# Patient Record
Sex: FEMALE | Race: WHITE | NOT HISPANIC OR LATINO | Employment: UNEMPLOYED | URBAN - METROPOLITAN AREA
[De-identification: names, ages, dates, MRNs, and addresses within clinical notes are randomized per-mention and may not be internally consistent; named-entity substitution may affect disease eponyms.]

---

## 2022-10-12 ENCOUNTER — OFFICE VISIT (OUTPATIENT)
Dept: FAMILY MEDICINE CLINIC | Facility: CLINIC | Age: 32
End: 2022-10-12

## 2022-10-12 DIAGNOSIS — J30.2 SEASONAL ALLERGIES: ICD-10-CM

## 2022-10-12 DIAGNOSIS — R53.82 CHRONIC FATIGUE: ICD-10-CM

## 2022-10-12 DIAGNOSIS — E55.9 VITAMIN D DEFICIENCY: ICD-10-CM

## 2022-10-12 DIAGNOSIS — M54.9 CHRONIC BILATERAL BACK PAIN, UNSPECIFIED BACK LOCATION: Primary | ICD-10-CM

## 2022-10-12 DIAGNOSIS — G89.29 CHRONIC BILATERAL BACK PAIN, UNSPECIFIED BACK LOCATION: Primary | ICD-10-CM

## 2022-10-12 PROCEDURE — 99204 OFFICE O/P NEW MOD 45 MIN: CPT | Performed by: FAMILY MEDICINE

## 2022-10-12 NOTE — PROGRESS NOTES
Subjective     Mariza Pagan is a 32 y.o. female.     Chief Complaint   Patient presents with   • Pain     Pt ears are in pain, mostly on the right and wakes her up during the night    • Allergies   • Back Pain   • Fatigue       History of Present Illness     She is c/o Lower back pain for > 6 years , constant , with on/off flare up, 4-7/10, dose not radiate   No pain when she is on her cycle   No h/o fall or trauma   C/o chronic fatigue with G body ache   Had PT with little help   Had XR L spine , she dose not know the result   Has normal BM  No urinary sx   Denies weakness or numbness   She has 3 kids 16,14,2 years   She has IUD for 2 years     Vit D defi; was on supplements     Seasonal allergies ; sx worse when she moved to [place] , taking OTC Rx with some help  She is c/o on/off Rt ear pain with fullness for > 1 month, no F,C , no discharge     No Labs and notes from previous PCP in [facility]     The following portions of the patient's history were reviewed and updated as appropriate: allergies, current medications, past family history, past medical history, past social history, past surgical history and problem list.        Review of Systems   Constitutional: Positive for fatigue. Negative for activity change, appetite change and chills.   HENT: Positive for congestion and ear pain.    Musculoskeletal: Positive for back pain and myalgias.       Wt Readings from Last 3 Encounters:   10/12/22 58.5 kg (129 lb)      Temp Readings from Last 3 Encounters:   10/12/22 98.3 °F (36.8 °C) (Infrared)     BP Readings from Last 3 Encounters:   10/12/22 100/56     Pulse Readings from Last 3 Encounters:   10/12/22 78         Vitals:    10/12/22 1612   BP: 100/56   Pulse: 78   Temp:    SpO2:            10/12/22  1552   Weight: 58.5 kg (129 lb)         Body mass index is 25.19 kg/m².      No current outpatient medications on file.     No current facility-administered medications for this visit.                Objective   Physical  Exam  Vitals and nursing note reviewed.   Constitutional:       General: She is not in acute distress.     Appearance: She is well-developed. She is not ill-appearing, toxic-appearing or diaphoretic.   HENT:      Head: Normocephalic.      Right Ear: Tympanic membrane, ear canal and external ear normal.      Left Ear: Tympanic membrane, ear canal and external ear normal.      Nose: Nose normal. No congestion or rhinorrhea.      Mouth/Throat:      Mouth: Mucous membranes are moist.      Pharynx: Oropharynx is clear.   Eyes:      Conjunctiva/sclera: Conjunctivae normal.   Neck:      Thyroid: No thyromegaly.      Comments: No enlarged thyroid    Cardiovascular:      Rate and Rhythm: Normal rate and regular rhythm.      Heart sounds: Normal heart sounds. No murmur heard.    No friction rub. No gallop.   Pulmonary:      Effort: Pulmonary effort is normal. No respiratory distress.      Breath sounds: Normal breath sounds. No stridor. No wheezing, rhonchi or rales.   Abdominal:      General: Bowel sounds are normal. There is no distension.      Palpations: Abdomen is soft. There is no mass.      Tenderness: There is no abdominal tenderness. There is no guarding or rebound.      Hernia: No hernia is present.   Musculoskeletal:         General: Deformity present. Normal range of motion.      Cervical back: Neck supple.      Comments: Asymmetry of his T-L spine    Skin:     General: Skin is warm.      Coloration: Skin is not pale.      Findings: No erythema or rash.   Neurological:      Mental Status: She is alert and oriented to person, place, and time.      Motor: No abnormal muscle tone.   Psychiatric:         Mood and Affect: Mood normal.         Behavior: Behavior normal.         Thought Content: Thought content normal.         Judgment: Judgment normal.           Assessment & Plan   Diagnoses and all orders for this visit:    1. Chronic bilateral back pain, unspecified back location (Primary);  - Concern for scoliosis    -     XR spine scoliosis 2 or 3 views; Future  -     XR Spine Lumbar 2 or 3 View; Future    2. Chronic fatigue;  - New problem, need w/u   - DDx; anemia vs lyte disorder vs thyroid dis vs vit d defi, vs others   -     Comprehensive metabolic panel; Future  -     CBC No Differential; Future  -     Vitamin B12; Future  -     TSH Rfx On Abnormal To Free T4; Future    3. Vitamin D deficiency  -     Vitamin D 25 hydroxy; Future    4. Seasonal allergies  - Stable, continue current Rx      Patient was given instructions and counseling regarding her condition or for health maintenance advice.   Please see specific information pulled into the AVS if appropriate.   Medical assistant and I wore mask and eyewear protection during entire encounter.    Patient wore mask.         I have fully discussed the nature of the medical condition(s) risks, complications, management, safe and proper use of medications.   She stated no allergy to the above prescribed medication.  I have discussed the SIDE EFFECT OF MEDICATION and importance TO report any side effect , the patient expressed good understanding.  Encouraged medication compliance and the importance of keeping scheduled follow up appointments with me and any other providers.    Patient instructed to follow up with our office for results on any labs/imaging ordered during this visit.    Home care discussed  All questions answered  Patient verbalizes understanding and agrees to treatment plan.     Follow up: Return in about 4 weeks (around 11/9/2022) for follow up on current illness, labs before apointment.

## 2022-10-13 VITALS
TEMPERATURE: 98.3 F | WEIGHT: 129 LBS | OXYGEN SATURATION: 99 % | HEART RATE: 78 BPM | BODY MASS INDEX: 25.32 KG/M2 | SYSTOLIC BLOOD PRESSURE: 100 MMHG | HEIGHT: 60 IN | DIASTOLIC BLOOD PRESSURE: 56 MMHG

## 2022-10-27 DIAGNOSIS — R53.82 CHRONIC FATIGUE: ICD-10-CM

## 2022-10-27 DIAGNOSIS — E55.9 VITAMIN D DEFICIENCY: ICD-10-CM

## 2022-12-01 ENCOUNTER — CLINICAL SUPPORT (OUTPATIENT)
Dept: FAMILY MEDICINE CLINIC | Facility: CLINIC | Age: 32
End: 2022-12-01

## 2022-12-01 NOTE — PROGRESS NOTES
Pt here for x-ray only apt. Lumbar x-ray performed. Masks/face shield/appropriate PPE were worn for the entirety of the visit by the patient and myself. Pt aware she will be notified w results after report is received from radiology. CS

## 2022-12-02 DIAGNOSIS — G89.29 CHRONIC BILATERAL BACK PAIN, UNSPECIFIED BACK LOCATION: ICD-10-CM

## 2022-12-02 DIAGNOSIS — M54.9 CHRONIC BILATERAL BACK PAIN, UNSPECIFIED BACK LOCATION: ICD-10-CM

## 2022-12-02 LAB
25(OH)D3+25(OH)D2 SERPL-MCNC: 29 NG/ML (ref 30–100)
ALBUMIN SERPL-MCNC: 4.7 G/DL (ref 3.5–5.2)
ALBUMIN/GLOB SERPL: 2 G/DL
ALP SERPL-CCNC: 44 U/L (ref 39–117)
ALT SERPL-CCNC: 10 U/L (ref 1–33)
AST SERPL-CCNC: 12 U/L (ref 1–32)
BILIRUB SERPL-MCNC: 0.2 MG/DL (ref 0–1.2)
BUN SERPL-MCNC: 10 MG/DL (ref 6–20)
BUN/CREAT SERPL: 17.5 (ref 7–25)
CALCIUM SERPL-MCNC: 9.3 MG/DL (ref 8.6–10.5)
CHLORIDE SERPL-SCNC: 106 MMOL/L (ref 98–107)
CO2 SERPL-SCNC: 25.7 MMOL/L (ref 22–29)
CREAT SERPL-MCNC: 0.57 MG/DL (ref 0.57–1)
EGFRCR SERPLBLD CKD-EPI 2021: 123.2 ML/MIN/1.73
ERYTHROCYTE [DISTWIDTH] IN BLOOD BY AUTOMATED COUNT: 16 % (ref 12.3–15.4)
GLOBULIN SER CALC-MCNC: 2.3 GM/DL
GLUCOSE SERPL-MCNC: 88 MG/DL (ref 65–99)
HCT VFR BLD AUTO: 30.3 % (ref 34–46.6)
HGB BLD-MCNC: 8.6 G/DL (ref 12–15.9)
MCH RBC QN AUTO: 21.7 PG (ref 26.6–33)
MCHC RBC AUTO-ENTMCNC: 28.4 G/DL (ref 31.5–35.7)
MCV RBC AUTO: 76.5 FL (ref 79–97)
PLATELET # BLD AUTO: 304 10*3/MM3 (ref 140–450)
POTASSIUM SERPL-SCNC: 4.3 MMOL/L (ref 3.5–5.2)
PROT SERPL-MCNC: 7 G/DL (ref 6–8.5)
RBC # BLD AUTO: 3.96 10*6/MM3 (ref 3.77–5.28)
SODIUM SERPL-SCNC: 140 MMOL/L (ref 136–145)
TSH SERPL DL<=0.005 MIU/L-ACNC: 2.95 UIU/ML (ref 0.27–4.2)
VIT B12 SERPL-MCNC: 501 PG/ML (ref 211–946)
WBC # BLD AUTO: 5.58 10*3/MM3 (ref 3.4–10.8)

## 2023-01-05 ENCOUNTER — OFFICE VISIT (OUTPATIENT)
Dept: FAMILY MEDICINE CLINIC | Facility: CLINIC | Age: 33
End: 2023-01-05
Payer: MEDICAID

## 2023-01-05 VITALS
TEMPERATURE: 98.2 F | WEIGHT: 128.4 LBS | RESPIRATION RATE: 16 BRPM | HEART RATE: 90 BPM | SYSTOLIC BLOOD PRESSURE: 96 MMHG | DIASTOLIC BLOOD PRESSURE: 58 MMHG | OXYGEN SATURATION: 99 % | BODY MASS INDEX: 25.08 KG/M2

## 2023-01-05 DIAGNOSIS — D50.0 IRON DEFICIENCY ANEMIA DUE TO CHRONIC BLOOD LOSS: ICD-10-CM

## 2023-01-05 DIAGNOSIS — R22.2 MASS IN CHEST: Primary | ICD-10-CM

## 2023-01-05 PROCEDURE — 99213 OFFICE O/P EST LOW 20 MIN: CPT | Performed by: FAMILY MEDICINE

## 2023-01-05 RX ORDER — CEPHALEXIN 500 MG/1
500 CAPSULE ORAL 2 TIMES DAILY
Qty: 14 CAPSULE | Refills: 0 | Status: SHIPPED | OUTPATIENT
Start: 2023-01-05 | End: 2023-01-16

## 2023-01-16 ENCOUNTER — OFFICE VISIT (OUTPATIENT)
Dept: FAMILY MEDICINE CLINIC | Facility: CLINIC | Age: 33
End: 2023-01-16
Payer: MEDICAID

## 2023-01-16 VITALS
TEMPERATURE: 96.6 F | WEIGHT: 128 LBS | DIASTOLIC BLOOD PRESSURE: 64 MMHG | OXYGEN SATURATION: 97 % | SYSTOLIC BLOOD PRESSURE: 100 MMHG | BODY MASS INDEX: 25 KG/M2 | RESPIRATION RATE: 16 BRPM | HEART RATE: 87 BPM

## 2023-01-16 DIAGNOSIS — R22.9 SUBCUTANEOUS MASS: Primary | ICD-10-CM

## 2023-01-16 DIAGNOSIS — K21.9 CHRONIC GERD: ICD-10-CM

## 2023-01-16 DIAGNOSIS — D50.0 IRON DEFICIENCY ANEMIA DUE TO CHRONIC BLOOD LOSS: ICD-10-CM

## 2023-01-16 DIAGNOSIS — R14.0 ABDOMINAL BLOATING WITH CRAMPS: ICD-10-CM

## 2023-01-16 DIAGNOSIS — R10.9 ABDOMINAL BLOATING WITH CRAMPS: ICD-10-CM

## 2023-01-16 PROCEDURE — 99214 OFFICE O/P EST MOD 30 MIN: CPT | Performed by: FAMILY MEDICINE

## 2023-01-16 RX ORDER — FERROUS SULFATE 325(65) MG
325 TABLET ORAL
COMMUNITY

## 2023-01-16 RX ORDER — FAMOTIDINE 40 MG/1
40 TABLET, FILM COATED ORAL
Qty: 30 TABLET | Refills: 2 | Status: SHIPPED | OUTPATIENT
Start: 2023-01-16 | End: 2023-02-01 | Stop reason: SDUPTHER

## 2023-01-16 NOTE — PROGRESS NOTES
Subjective     Alberto Vanegas is a 32 y.o. female.     Chief Complaint   Patient presents with   • Mass     Patient would like to get a US   • Abdominal Pain   • Heartburn   • Anemia       History of Present Illness     Pt has retrosternal lump for > 4 weeks , hurts to touch ,increasing in size , no F,C . Started on Abx with no better.     Anemia ; due to heavy cycle. She starts taking iron pills recently but makes her stomach sick , she takes it at night with less discomfort.      Chronic abd distension with cramps for > 1 year; sx on/off , worse when she is eats meat , beans and milk pd. als has heartburn worse with spicy and acidic food.   Has every other day BM with hard stool       The following portions of the patient's history were reviewed and updated as appropriate: allergies, current medications, past family history, past medical history, past social history, past surgical history and problem list.        Review of Systems   Gastrointestinal: Positive for abdominal distention, abdominal pain, constipation, nausea, GERD and indigestion. Negative for anal bleeding, blood in stool and vomiting.       Vitals:    01/16/23 0955   BP: 100/64   Pulse: 87   Resp: 16   Temp: 96.6 °F (35.9 °C)   SpO2: 97%           01/16/23  0955   Weight: 58.1 kg (128 lb)         Body mass index is 25 kg/m².      Current Outpatient Medications   Medication Sig Dispense Refill   • ferrous sulfate 325 (65 FE) MG tablet Take 325 mg by mouth Daily With Breakfast.     • famotidine (Pepcid) 40 MG tablet Take 1 tablet by mouth Every Morning Before Breakfast. 30 tablet 2     No current facility-administered medications for this visit.                Objective   Physical Exam  Vitals and nursing note reviewed.   Constitutional:       General: She is not in acute distress.     Appearance: She is not ill-appearing, toxic-appearing or diaphoretic.   HENT:      Mouth/Throat:      Mouth: Mucous membranes are moist.   Eyes:       Conjunctiva/sclera: Conjunctivae normal.   Neck:      Comments: No enlarged thyroid    Cardiovascular:      Rate and Rhythm: Normal rate and regular rhythm.      Heart sounds: Normal heart sounds. No murmur heard.  Pulmonary:      Effort: Pulmonary effort is normal. No respiratory distress.      Breath sounds: Normal breath sounds. No stridor. No wheezing or rhonchi.   Abdominal:      General: Bowel sounds are normal. There is distension.      Palpations: Abdomen is soft. There is no mass.      Tenderness: There is no abdominal tenderness. There is no guarding or rebound.      Hernia: No hernia is present.   Musculoskeletal:      Cervical back: Neck supple.   Skin:     General: Skin is warm.      Coloration: Skin is pale.      Findings: No erythema or rash.   Neurological:      Mental Status: She is alert and oriented to person, place, and time.   Psychiatric:         Mood and Affect: Mood normal.         Behavior: Behavior normal.         Thought Content: Thought content normal.           Assessment & Plan   Diagnoses and all orders for this visit:    1. Subcutaneous mass (Primary)  -     US Head Neck Soft Tissue; Future    2. Iron deficiency anemia due to chronic blood loss;  - Continue diet and IRON PILLS   - Advised to take colace from OTC to avoid constipation     3. Abdominal bloating with cramps;   concern for food allergy ?  -     Food Allergen Panel With / IgE  -     H. Pylori Breath Test - Breath, Lung    4. Chronic GERD;  - Will try;   -     famotidine (Pepcid) 40 MG tablet; Take 1 tablet by mouth Every Morning Before Breakfast.  Dispense: 30 tablet; Refill: 2  Discussed with patient for decreasing RYANN symptoms: eliminate foods that may trigger symptoms, avoid alcohol at bedtime, and avoid lying down after 3 hours of eating.  Common irritating foods include: chocolate, garlic, onions, citrus fruits, coffee, alcohol, highly seasoned foods and carbonated beverages.     Patient was given instructions and  counseling regarding her condition or for health maintenance advice.   Please see specific information pulled into the AVS if appropriate.   Medical assistant and I wore mask and eyewear protection during entire encounter.    Patient wore mask.         I have fully discussed the nature of the medical condition(s) risks, complications, management, safe and proper use of medications.   She stated no allergy to the above prescribed medication.  I have discussed the SIDE EFFECT OF MEDICATION and importance TO report any side effect , the patient expressed good understanding.  Encouraged medication compliance and the importance of keeping scheduled follow up appointments with me and any other providers.    Patient instructed to follow up with our office for results on any labs/imaging ordered during this visit.    Home care discussed  All questions answered  Patient verbalizes understanding and agrees to treatment plan.     Follow up: Return for WILL CALL YOU FOR LBAS/XR RESULT.

## 2023-01-17 LAB — UREA BREATH TEST QL: POSITIVE

## 2023-01-19 LAB
CLAM IGE QN: <0.1 KU/L
CODFISH IGE QN: <0.1 KU/L
CONV CLASS DESCRIPTION: NORMAL
CORN IGE QN: <0.1 KU/L
COW MILK IGE QN: <0.1 KU/L
EGG WHITE IGE QN: <0.1 KU/L
IGE SERPL-ACNC: 81 IU/ML (ref 6–495)
PEANUT IGE QN: <0.1 KU/L
SCALLOP IGE QN: <0.1 KU/L
SHRIMP IGE QN: <0.1 KU/L
SOYBEAN IGE QN: <0.1 KU/L
WALNUT IGE QN: <0.1 KU/L
WHEAT IGE QN: <0.1 KU/L

## 2023-01-27 ENCOUNTER — HOSPITAL ENCOUNTER (OUTPATIENT)
Dept: ULTRASOUND IMAGING | Facility: HOSPITAL | Age: 33
Discharge: HOME | End: 2023-01-27
Admitting: FAMILY MEDICINE
Payer: MEDICAID

## 2023-01-27 DIAGNOSIS — R22.9 SUBCUTANEOUS MASS: ICD-10-CM

## 2023-01-27 PROCEDURE — 76604 US EXAM CHEST: CPT

## 2023-02-01 ENCOUNTER — OFFICE VISIT (OUTPATIENT)
Dept: FAMILY MEDICINE CLINIC | Facility: CLINIC | Age: 33
End: 2023-02-01
Payer: MEDICAID

## 2023-02-01 VITALS
HEART RATE: 96 BPM | DIASTOLIC BLOOD PRESSURE: 64 MMHG | HEIGHT: 60 IN | SYSTOLIC BLOOD PRESSURE: 98 MMHG | OXYGEN SATURATION: 100 % | TEMPERATURE: 97.7 F | BODY MASS INDEX: 25.21 KG/M2 | WEIGHT: 128.4 LBS | RESPIRATION RATE: 16 BRPM

## 2023-02-01 DIAGNOSIS — A04.8 H. PYLORI INFECTION: Primary | ICD-10-CM

## 2023-02-01 PROCEDURE — 99214 OFFICE O/P EST MOD 30 MIN: CPT | Performed by: FAMILY MEDICINE

## 2023-02-01 RX ORDER — CLARITHROMYCIN 500 MG/1
500 TABLET, COATED ORAL 2 TIMES DAILY
Qty: 28 TABLET | Refills: 0 | Status: SHIPPED | OUTPATIENT
Start: 2023-02-01 | End: 2023-02-15

## 2023-02-01 RX ORDER — OMEPRAZOLE 20 MG/1
20 CAPSULE, DELAYED RELEASE ORAL 2 TIMES DAILY
Qty: 28 CAPSULE | Refills: 0 | Status: SHIPPED | OUTPATIENT
Start: 2023-02-01 | End: 2023-02-15

## 2023-02-01 RX ORDER — AMOXICILLIN 500 MG/1
1000 CAPSULE ORAL 2 TIMES DAILY
Qty: 56 CAPSULE | Refills: 0 | Status: SHIPPED | OUTPATIENT
Start: 2023-02-01 | End: 2023-02-15

## 2023-02-01 RX ORDER — BISMUTH SUBSALICYLATE 262 MG
2 TABLET ORAL 4 TIMES DAILY
Qty: 112 EACH | Refills: 0 | Status: SHIPPED | OUTPATIENT
Start: 2023-02-01 | End: 2023-02-15

## 2023-02-01 NOTE — PROGRESS NOTES
Subjective     Alberto Vanegas is a 32 y.o. female.     Chief Complaint   Patient presents with   • Abdominal Pain   • Cyst   • discuss US and lab  results       History of Present Illness     Pt with chronic stomach pain with bloating , N and GERD for > 1 year. Eating HD with no better.  Labs done last OV, showed +ve for H. Pylori Rx  Her  dx with it many years ago, was on Rx.    Pt has retrosternal lump for > 8 weeks , hurts to touch.  US done was neg, discussed with pt.       The following portions of the patient's history were reviewed and updated as appropriate: allergies, current medications, past family history, past medical history, past social history, past surgical history and problem list.        Review of Systems   Gastrointestinal: Positive for abdominal distention, abdominal pain, nausea and GERD. Negative for vomiting.       Vitals:    02/01/23 1107   BP: 98/64   Pulse: 96   Resp: 16   Temp: 97.7 °F (36.5 °C)   SpO2: 100%           02/01/23  1107   Weight: 58.2 kg (128 lb 6.4 oz)         Body mass index is 25.08 kg/m².      Current Outpatient Medications   Medication Sig Dispense Refill   • BIOTIN PO Take  by mouth.     • ferrous sulfate 325 (65 FE) MG tablet Take 325 mg by mouth Daily With Breakfast.     • amoxicillin (AMOXIL) 500 MG capsule Take 2 capsules by mouth 2 (Two) Times a Day for 14 days. 56 capsule 0   • Bismuth Subsalicylate ([facility] Bismuth) 262 MG tablet Take 2 tablets by mouth 4 (Four) Times a Day for 14 days. 112 each 0   • clarithromycin (BIAXIN) 500 MG tablet Take 1 tablet by mouth 2 (Two) Times a Day for 14 days. 28 tablet 0   • omeprazole (priLOSEC) 20 MG capsule Take 1 capsule by mouth 2 (Two) Times a Day for 14 days. 28 capsule 0     No current facility-administered medications for this visit.                Objective   Physical Exam  Vitals and nursing note reviewed.   Constitutional:       General: She is not in acute distress.     Appearance: She is not  ill-appearing, toxic-appearing or diaphoretic.   HENT:      Mouth/Throat:      Mouth: Mucous membranes are moist.   Abdominal:      General: Bowel sounds are normal. There is no distension.      Palpations: Abdomen is soft. There is no mass.      Tenderness: There is abdominal tenderness. There is no guarding or rebound.      Hernia: No hernia is present.      Comments: Mild epigast TTP   Skin:     General: Skin is warm.      Coloration: Skin is not jaundiced.   Neurological:      Mental Status: She is alert and oriented to person, place, and time.   Psychiatric:         Mood and Affect: Mood normal.         Behavior: Behavior normal.         Thought Content: Thought content normal.         Judgment: Judgment normal.           Assessment & Plan   Diagnoses and all orders for this visit:    1. H. pylori infection (Primary)  - New problem, discussed plan of Rx and S/E in details   -     clarithromycin (BIAXIN) 500 MG tablet; Take 1 tablet by mouth 2 (Two) Times a Day for 14 days.  Dispense: 28 tablet; Refill: 0  -     amoxicillin (AMOXIL) 500 MG capsule; Take 2 capsules by mouth 2 (Two) Times a Day for 14 days.  Dispense: 56 capsule; Refill: 0  -     omeprazole (priLOSEC) 20 MG capsule; Take 1 capsule by mouth 2 (Two) Times a Day for 14 days.  Dispense: 28 capsule; Refill: 0  -     Bismuth Subsalicylate ([facility] Bismuth) 262 MG tablet; Take 2 tablets by mouth 4 (Four) Times a Day for 14 days.  Dispense: 112 each; Refill: 0  - Close monitoring and f/u in 6-8 weeks     Patient was given instructions and counseling regarding her condition or for health maintenance advice.   Please see specific information pulled into the AVS if appropriate.   Medical assistant and I wore mask and eyewear protection during entire encounter.    Patient wore mask.         I have fully discussed the nature of the medical condition(s) risks, complications, management, safe and proper use of medications.   She stated no allergy to the above  prescribed medication.  I have discussed the SIDE EFFECT OF MEDICATION and importance TO report any side effect , the patient expressed good understanding.  Encouraged medication compliance and the importance of keeping scheduled follow up appointments with me and any other providers.    Patient instructed to follow up with our office for results on any labs/imaging ordered during this visit.    Home care discussed  All questions answered  Patient verbalizes understanding and agrees to treatment plan.     Follow up: Return in about 6 weeks (around 3/16/2023) for follow up on current illness.

## 2023-02-02 ENCOUNTER — TELEPHONE (OUTPATIENT)
Dept: FAMILY MEDICINE CLINIC | Facility: CLINIC | Age: 33
End: 2023-02-02
Payer: MEDICAID

## 2023-02-02 ENCOUNTER — PRIOR AUTHORIZATION (OUTPATIENT)
Dept: FAMILY MEDICINE CLINIC | Facility: CLINIC | Age: 33
End: 2023-02-02
Payer: MEDICAID

## 2023-02-02 NOTE — TELEPHONE ENCOUNTER
Omeprazole should be 2 x day as it is for H pylori treatment ,plz do PA if needed   plz let her know to start all medication at same time

## 2023-02-02 NOTE — TELEPHONE ENCOUNTER
Patient stated can she take omeprazole 1x day because insurance won't cover twice a day? Or I can try to do a PA to see if they will cover twice a day. pepto bismol patient have to get otc.

## 2023-02-02 NOTE — TELEPHONE ENCOUNTER
Pt called because the pharmacy never received 2 of 4 scripts but she states she doesn't know which 2 they did receive and refused to call the pharmacy back and ask and would like all 4 scripts to be called in again.

## 2023-03-29 ENCOUNTER — OFFICE VISIT (OUTPATIENT)
Dept: FAMILY MEDICINE CLINIC | Facility: CLINIC | Age: 33
End: 2023-03-29
Payer: MEDICAID

## 2023-03-29 VITALS
SYSTOLIC BLOOD PRESSURE: 98 MMHG | DIASTOLIC BLOOD PRESSURE: 68 MMHG | TEMPERATURE: 97.7 F | HEIGHT: 60 IN | WEIGHT: 124 LBS | BODY MASS INDEX: 24.35 KG/M2 | OXYGEN SATURATION: 98 % | HEART RATE: 81 BPM

## 2023-03-29 DIAGNOSIS — K21.9 CHRONIC GERD: ICD-10-CM

## 2023-03-29 DIAGNOSIS — R14.0 ABDOMINAL BLOATING WITH CRAMPS: ICD-10-CM

## 2023-03-29 DIAGNOSIS — R68.89 COLD INTOLERANCE: ICD-10-CM

## 2023-03-29 DIAGNOSIS — H60.503 ACUTE OTITIS EXTERNA OF BOTH EARS, UNSPECIFIED TYPE: ICD-10-CM

## 2023-03-29 DIAGNOSIS — D50.0 IRON DEFICIENCY ANEMIA DUE TO CHRONIC BLOOD LOSS: ICD-10-CM

## 2023-03-29 DIAGNOSIS — A04.8 H. PYLORI INFECTION: Primary | ICD-10-CM

## 2023-03-29 DIAGNOSIS — E53.8 VITAMIN B12 DEFICIENCY: ICD-10-CM

## 2023-03-29 DIAGNOSIS — R10.9 ABDOMINAL BLOATING WITH CRAMPS: ICD-10-CM

## 2023-03-29 PROCEDURE — 1160F RVW MEDS BY RX/DR IN RCRD: CPT | Performed by: FAMILY MEDICINE

## 2023-03-29 PROCEDURE — 99214 OFFICE O/P EST MOD 30 MIN: CPT | Performed by: FAMILY MEDICINE

## 2023-03-29 PROCEDURE — 1159F MED LIST DOCD IN RCRD: CPT | Performed by: FAMILY MEDICINE

## 2023-03-29 NOTE — PROGRESS NOTES
Subjective     Alberto Vanegas is a 32 y.o. female.     Chief Complaint   Patient presents with   • H. pylori     2M fu - sx improved while on abx - pt is still having abd discomfort    • Ear Fullness       History of Present Illness     Pt with known H pylori, started on medication 2 months ago, her sx completely resolved with Rx.   Once she finished the medication , her sx back c/o bad reflux and stomach pain.    She is concerning for thyroid dis as she has cold intolerance and hair loss . Last TSH was normal but she was on biotin for hair loss. She stated that she stopped taking biotin to get her TSH rechecked     Vit B12 DEFIC; no longer n supplements , feels tired     Anemia ; she is on iron pills , her Hb was low 8.6 , her cycle very heavy.  She has IUD     C/o bilateral ears fullness , no discharge     Lab Results   Component Value Date    WBC 5.63 03/29/2023    HGB 11.9 (L) 03/29/2023    HCT 36.4 03/29/2023    MCV 88.1 03/29/2023     03/29/2023              The following portions of the patient's history were reviewed and updated as appropriate: allergies, current medications, past family history, past medical history, past social history, past surgical history and problem list.        Review of Systems   Constitutional: Positive for fatigue.   Gastrointestinal: Positive for abdominal distention, abdominal pain and GERD.       Vitals:    03/29/23 1232   BP: 98/68   Pulse: 81   Temp: 97.7 °F (36.5 °C)   SpO2: 98%           03/29/23  1232   Weight: 56.2 kg (124 lb)         Body mass index is 24.22 kg/m².      Current Outpatient Medications   Medication Sig Dispense Refill   • ferrous sulfate 325 (65 FE) MG tablet Take 1 tablet by mouth Daily With Breakfast.     • BIOTIN PO Take  by mouth. (Patient not taking: Reported on 3/29/2023)     • omeprazole (priLOSEC) 20 MG capsule Take 1 capsule by mouth 2 (Two) Times a Day. 60 capsule 2     No current facility-administered medications for this  visit.                Objective   Physical Exam  Vitals and nursing note reviewed.   Constitutional:       General: She is not in acute distress.     Appearance: She is not ill-appearing, toxic-appearing or diaphoretic.   HENT:      Mouth/Throat:      Mouth: Mucous membranes are moist.   Neck:      Comments: No enlarged thyroid    Cardiovascular:      Rate and Rhythm: Normal rate and regular rhythm.      Heart sounds: Normal heart sounds. No murmur heard.  Pulmonary:      Effort: Pulmonary effort is normal. No respiratory distress.      Breath sounds: Normal breath sounds. No stridor. No wheezing or rhonchi.   Abdominal:      General: Bowel sounds are normal. There is no distension.      Palpations: Abdomen is soft. There is no mass.      Tenderness: There is no abdominal tenderness. There is no guarding or rebound.      Hernia: No hernia is present.   Musculoskeletal:      Cervical back: Neck supple.   Skin:     Coloration: Skin is pale.   Neurological:      Mental Status: She is alert.   Psychiatric:         Mood and Affect: Mood normal.         Behavior: Behavior normal.         Thought Content: Thought content normal.         Judgment: Judgment normal.           Assessment & Plan   Diagnoses and all orders for this visit:    1. H. pylori infection (Primary);  - Will recheck for H. Pylori after Rx  -     H. Pylori Breath Test - Breath, Lung  - Consider CT abd vs referral to GI for EGD if no better     2. Iron deficiency anemia due to chronic blood loss  -     CBC (No Diff)    3. Abdominal bloating with cramps  - Will recheck for H. Pylori after Rx  -     H. Pylori Breath Test - Breath, Lung  - Consider CT abd vs referral to GI for EGD if no better     4. Chronic GERD  - Will recheck for H. Pylori after Rx  -     H. Pylori Breath Test - Breath, Lung  - Will start on daily PPI  - OMEPRAZOLE 20 MG bid     5. Cold intolerance  -     TSH Rfx On Abnormal To Free T4    6. Vitamin B12 deficiency  -     Vitamin  B12      7. Acute OE of both ears;  - cipro-HC ear drops       Patient was given instructions and counseling regarding her condition or for health maintenance advice.   Please see specific information pulled into the AVS if appropriate.         I have fully discussed the nature of the medical condition(s) risks, complications, management, safe and proper use of medications.   She stated no allergy to the above prescribed medication.  I have discussed the SIDE EFFECT OF MEDICATION and importance TO report any side effect , the patient expressed good understanding.  Encouraged medication compliance and the importance of keeping scheduled follow up appointments with me and any other providers.    Patient instructed to follow up with our office for results on any labs/imaging ordered during this visit.    Home care discussed  All questions answered  Patient verbalizes understanding and agrees to treatment plan.     Follow up: Return for WILL CALL YOU FOR LBAS/XR RESULT.

## 2023-03-30 LAB
ERYTHROCYTE [DISTWIDTH] IN BLOOD BY AUTOMATED COUNT: 15.6 % (ref 12.3–15.4)
HCT VFR BLD AUTO: 36.4 % (ref 34–46.6)
HGB BLD-MCNC: 11.9 G/DL (ref 12–15.9)
MCH RBC QN AUTO: 28.8 PG (ref 26.6–33)
MCHC RBC AUTO-ENTMCNC: 32.7 G/DL (ref 31.5–35.7)
MCV RBC AUTO: 88.1 FL (ref 79–97)
PLATELET # BLD AUTO: 199 10*3/MM3 (ref 140–450)
RBC # BLD AUTO: 4.13 10*6/MM3 (ref 3.77–5.28)
TSH SERPL DL<=0.005 MIU/L-ACNC: 2.34 UIU/ML (ref 0.27–4.2)
UREA BREATH TEST QL: NEGATIVE
VIT B12 SERPL-MCNC: 517 PG/ML (ref 211–946)
WBC # BLD AUTO: 5.63 10*3/MM3 (ref 3.4–10.8)

## 2023-03-30 RX ORDER — OMEPRAZOLE 20 MG/1
20 CAPSULE, DELAYED RELEASE ORAL 2 TIMES DAILY
Qty: 60 CAPSULE | Refills: 2 | Status: SHIPPED | OUTPATIENT
Start: 2023-03-30

## 2023-04-03 ENCOUNTER — TELEPHONE (OUTPATIENT)
Dept: FAMILY MEDICINE CLINIC | Facility: CLINIC | Age: 33
End: 2023-04-03

## 2023-04-03 RX ORDER — CIPROFLOXACIN/HYDROCORTISONE 0.2 %-1 %
3 SUSPENSION, DROPS(FINAL DOSAGE FORM)(ML) OTIC (EAR) 2 TIMES DAILY
Qty: 10 ML | Refills: 0 | Status: SHIPPED | OUTPATIENT
Start: 2023-04-03 | End: 2023-04-04

## 2023-04-03 NOTE — TELEPHONE ENCOUNTER
Caller: Alberto Vanegas    Relationship: Self    Best call back number: [phone]    What is the best time to reach you: ANY TIME    Who are you requesting to speak with (clinical staff, provider,  specific staff member): CLINICAL STAFF    What was the call regarding: PATIENT STATES THAT SHE SAW DR. SHEN IN OFFICE ON 3/29/23    AND DR. SHEN WAS SUPPOSED TO SEND A PRESCRIPTION FOR EAR DROPS TO THE PHARMACY.   PATIENT SAYS THAT THE PHARMACY DOES NOT HAVE A PRESCRIPTION.    Do you require a callback: YES    PLEASE ADVISE.

## 2023-04-04 ENCOUNTER — PRIOR AUTHORIZATION (OUTPATIENT)
Dept: FAMILY MEDICINE CLINIC | Facility: CLINIC | Age: 33
End: 2023-04-04
Payer: MEDICAID

## 2023-09-26 DIAGNOSIS — K21.9 CHRONIC GERD: ICD-10-CM

## 2023-09-26 RX ORDER — OMEPRAZOLE 20 MG/1
CAPSULE, DELAYED RELEASE ORAL
Qty: 60 CAPSULE | Refills: 2 | Status: SHIPPED | OUTPATIENT
Start: 2023-09-26

## 2023-10-27 NOTE — PROGRESS NOTES
Subjective     Alberto Vanegsa is a 32 y.o. female.     Chief Complaint   Patient presents with   • Follow-up     Discuss labs   • chest wall nodule        History of Present Illness     Last OV , labs ordered, showed Anemia eith Hb of 8.6. she feels fatigue and tired. Has good appetite   Has heavy cycle , last for 10 days , heavy for first 2 days with clots , change ~ 10 x /day.   Has IUD since 6/2020  Not on iron pills  On daily Vit D 2000 U supplements , her last Vit D level was Vit D 29.0    Lab Results   Component Value Date    WBC 5.58 12/01/2022    HGB 8.6 (L) 12/01/2022    HCT 30.3 (L) 12/01/2022    MCV 76.5 (L) 12/01/2022     12/01/2022       C/o retrosternal lump for 1 month , hurts to touch ,increasing in size , no F,C       Lab Results   Component Value Date    GLUCOSE 88 12/01/2022    BUN 10 12/01/2022    CREATININE 0.57 12/01/2022    BCR 17.5 12/01/2022    K 4.3 12/01/2022    CO2 25.7 12/01/2022    CALCIUM 9.3 12/01/2022    PROTENTOTREF 7.0 12/01/2022    ALBUMIN 4.70 12/01/2022    LABIL2 2.0 12/01/2022    AST 12 12/01/2022    ALT 10 12/01/2022       TSH    TSH 12/1/22   TSH 2.950               The following portions of the patient's history were reviewed and updated as appropriate: allergies, current medications, past family history, past medical history, past social history, past surgical history and problem list.        Review of Systems   Constitutional: Positive for fatigue. Negative for activity change, appetite change, chills and fever.       Vitals:    01/05/23 1047   BP: 96/58   Pulse: 90   Resp: 16   Temp: 98.2 °F (36.8 °C)   SpO2: 99%           01/05/23  1047   Weight: 58.2 kg (128 lb 6.4 oz)         Body mass index is 25.08 kg/m².      Current Outpatient Medications   Medication Sig Dispense Refill   • cephalexin (Keflex) 500 MG capsule Take 1 capsule by mouth 2 (Two) Times a Day. 14 capsule 0     No current facility-administered medications for this visit.                 Objective   Physical Exam  Vitals and nursing note reviewed.   Constitutional:       General: She is not in acute distress.     Appearance: She is not ill-appearing, toxic-appearing or diaphoretic.   HENT:      Mouth/Throat:      Mouth: Mucous membranes are moist.   Cardiovascular:      Rate and Rhythm: Normal rate and regular rhythm.      Heart sounds: Normal heart sounds. No murmur heard.  Pulmonary:      Effort: Pulmonary effort is normal. No respiratory distress.      Breath sounds: Normal breath sounds. No stridor. No wheezing or rhonchi.   Musculoskeletal:      Cervical back: Neck supple.   Skin:     General: Skin is warm.      Coloration: Skin is pale.      Comments: Very small nodule under the retrosternal area between the 2 breasts    Neurological:      Mental Status: She is alert and oriented to person, place, and time.   Psychiatric:         Mood and Affect: Mood normal.         Behavior: Behavior normal.         Thought Content: Thought content normal.           Assessment & Plan   Diagnoses and all orders for this visit:    1. Mass in chest (Primary)  Comments:  under the skin, will treat with ab, if no better will do US  Orders:  -     cephalexin (Keflex) 500 MG capsule; Take 1 capsule by mouth 2 (Two) Times a Day.  Dispense: 14 capsule; Refill: 0    2. Iron deficiency anemia due to chronic blood loss;  - Advised to take iron pills from OTC, f/u with GYN for IUD check , consider other contraception method.            Patient was given instructions and counseling regarding her condition or for health maintenance advice.   Please see specific information pulled into the AVS if appropriate.   Medical assistant and I wore mask and eyewear protection during entire encounter.    Patient wore mask.         I have fully discussed the nature of the medical condition(s) risks, complications, management, safe and proper use of medications.   She stated no allergy to the above prescribed medication.  I have  discussed the SIDE EFFECT OF MEDICATION and importance TO report any side effect , the patient expressed good understanding.  Encouraged medication compliance and the importance of keeping scheduled follow up appointments with me and any other providers.    Patient instructed to follow up with our office for results on any labs/imaging ordered during this visit.    Home care discussed  All questions answered  Patient verbalizes understanding and agrees to treatment plan.     Follow up: Return in about 3 months (around 4/5/2023) for follow up on current illness.            incontinence

## 2024-05-29 ENCOUNTER — OFFICE VISIT (OUTPATIENT)
Dept: FAMILY MEDICINE CLINIC | Facility: CLINIC | Age: 34
End: 2024-05-29
Payer: COMMERCIAL

## 2024-05-29 VITALS
BODY MASS INDEX: 25.68 KG/M2 | SYSTOLIC BLOOD PRESSURE: 90 MMHG | TEMPERATURE: 97.5 F | WEIGHT: 130.8 LBS | HEIGHT: 60 IN | DIASTOLIC BLOOD PRESSURE: 62 MMHG | RESPIRATION RATE: 16 BRPM | OXYGEN SATURATION: 98 % | HEART RATE: 90 BPM

## 2024-05-29 DIAGNOSIS — G89.29 CHRONIC PAIN OF LEFT KNEE: ICD-10-CM

## 2024-05-29 DIAGNOSIS — R53.82 CHRONIC FATIGUE: Primary | ICD-10-CM

## 2024-05-29 DIAGNOSIS — M25.562 CHRONIC PAIN OF LEFT KNEE: ICD-10-CM

## 2024-05-29 DIAGNOSIS — N92.1 MENORRHAGIA WITH IRREGULAR CYCLE: ICD-10-CM

## 2024-05-29 DIAGNOSIS — D50.0 IRON DEFICIENCY ANEMIA DUE TO CHRONIC BLOOD LOSS: ICD-10-CM

## 2024-05-29 DIAGNOSIS — E55.9 VITAMIN D DEFICIENCY: ICD-10-CM

## 2024-05-29 DIAGNOSIS — R21 RASH: ICD-10-CM

## 2024-05-29 PROCEDURE — 99214 OFFICE O/P EST MOD 30 MIN: CPT | Performed by: FAMILY MEDICINE

## 2024-05-29 PROCEDURE — 1160F RVW MEDS BY RX/DR IN RCRD: CPT | Performed by: FAMILY MEDICINE

## 2024-05-29 PROCEDURE — 1159F MED LIST DOCD IN RCRD: CPT | Performed by: FAMILY MEDICINE

## 2024-05-29 PROCEDURE — 1125F AMNT PAIN NOTED PAIN PRSNT: CPT | Performed by: FAMILY MEDICINE

## 2024-05-29 RX ORDER — TRIAMCINOLONE ACETONIDE 1 MG/G
1 OINTMENT TOPICAL 2 TIMES DAILY
Qty: 80 G | Refills: 1 | Status: SHIPPED | OUTPATIENT
Start: 2024-05-29

## 2024-05-29 RX ORDER — SWAB
1 SWAB, NON-MEDICATED MISCELLANEOUS DAILY
Qty: 90 EACH | Refills: 2 | Status: SHIPPED | OUTPATIENT
Start: 2024-05-29

## 2024-05-29 NOTE — PROGRESS NOTES
Subjective     Alberto Vanegas is a 34 y.o. female.     Chief Complaint   Patient presents with    Pain     Feet, finger, hand x 6 months.    Itchy chest     1 1/2 year.       History of Present Illness     Due to language barrier, an Tajik  was present during the history-taking and subsequent discussion (and for part of the physical exam) with this patient.      She is c/o chronic on/off hands and feet spasm for 6 months, her fingers contracted.    Chronic Lt knee pain , worse when she bends, 4-7/10. No h/o fall or trauma   She is concerning for ARTHRITIS.     C/o fatigue and tired for > 3 months, not depressed . Her appetite is good.    Has on/off rash under breasts and ambit , itchy.     VIT D DEFI; WAS ON SUPPLE    ANEMIA; WAS ON IRON PILLS, FEELS TIRED   Has IUD , her cycle heavy and last > 1 week   She is planning to take off the IUD       The following portions of the patient's history were reviewed and updated as appropriate: allergies, current medications, past family history, past medical history, past social history, past surgical history, and problem list.        Review of Systems   Skin:  Positive for color change.       Vitals:    05/29/24 1505   BP: 90/62   Pulse: 90   Resp: 16   Temp: 97.5 °F (36.4 °C)   SpO2: 98%           05/29/24  1505   Weight: 59.3 kg (130 lb 12.8 oz)         Body mass index is 25.55 kg/m².      Current Outpatient Medications   Medication Sig Dispense Refill    BIOTIN PO Take  by mouth.      ferrous sulfate 325 (65 FE) MG tablet Take 1 tablet by mouth Daily With Breakfast.      Prenatal 28-0.8 MG tablet Take 1 tablet by mouth Daily. 90 each 2    triamcinolone (KENALOG) 0.1 % ointment Apply 1 Application topically to the appropriate area as directed 2 (Two) Times a Day. 80 g 1     No current facility-administered medications for this visit.                Objective   Physical Exam  Vitals and nursing note reviewed.   Constitutional:       General: She is not in  acute distress.     Appearance: She is not toxic-appearing.   Cardiovascular:      Rate and Rhythm: Normal rate and regular rhythm.      Heart sounds: Normal heart sounds. No murmur heard.  Pulmonary:      Effort: Pulmonary effort is normal. No respiratory distress.      Breath sounds: Normal breath sounds. No stridor. No wheezing or rhonchi.   Musculoskeletal:         General: No swelling, tenderness or deformity. Normal range of motion.   Skin:     Coloration: Skin is pale.      Findings: Erythema and rash present.   Neurological:      Mental Status: She is alert and oriented to person, place, and time.   Psychiatric:         Mood and Affect: Mood normal.         Behavior: Behavior normal.         Thought Content: Thought content normal.           Assessment & Plan   Diagnoses and all orders for this visit:    1. Chronic fatigue (Primary)  -     Prenatal 28-0.8 MG tablet; Take 1 tablet by mouth Daily.  Dispense: 90 each; Refill: 2  -     Vitamin B12  -     Basic Metabolic Panel  -     HARRY by IFA, Reflex 9-biomarkers profile  -     Ferritin    2. Chronic pain of left knee    3. Vitamin D deficiency  -     Vitamin D,25-Hydroxy    4. Rash  Comments:  discussed supportive care  Orders:  -     triamcinolone (KENALOG) 0.1 % ointment; Apply 1 Application topically to the appropriate area as directed 2 (Two) Times a Day.  Dispense: 80 g; Refill: 1    5. Iron deficiency anemia due to chronic blood loss  Comments:  likely from blood loss/heavy cycle  advised to take daily MVT    6. Menorrhagia with irregular cycle  Comments:  hoda  2/2 IUD, plan to remove it           I spent 35 minutes caring for this patient on this date of service. This time includes time spent by me in the following activities:preparing for the visit,reviewing previous medical records,  performing a medically appropriate examination and/or evaluation, counseling and educating the patient/family/caregiver, and documenting information in the medical  record.       Patient was given instructions and counseling regarding her condition or for health maintenance advice. Please see specific information pulled into the AVS if appropriate.       I have fully discussed the nature of the medical condition(s) risks, complications, management, safe and proper use of medications.   Pt stated no allergy to the above prescribed medication.  I have discussed the SIDE EFFECT OF MEDICATION and importance TO report any side effect , the patient expressed good understanding.  Encouraged medication compliance and the importance of keeping scheduled follow up appointments with me and any other providers.    Patient instructed to follow up with our office for results on any labs/imaging ordered during this visit.    Home care discussed  All questions answered  Patient verbalizes understanding and agrees to treatment plan.     Follow up: Return for WILL CALL YOU FOR LBAS/XR RESULT.

## 2024-05-31 LAB
25(OH)D3+25(OH)D2 SERPL-MCNC: 29.4 NG/ML (ref 30–100)
ANA SER QL IF: NEGATIVE
BUN SERPL-MCNC: 13 MG/DL (ref 6–20)
BUN/CREAT SERPL: 27.1 (ref 7–25)
CALCIUM SERPL-MCNC: 9.5 MG/DL (ref 8.6–10.5)
CHLORIDE SERPL-SCNC: 103 MMOL/L (ref 98–107)
CO2 SERPL-SCNC: 25.6 MMOL/L (ref 22–29)
CREAT SERPL-MCNC: 0.48 MG/DL (ref 0.57–1)
EGFRCR SERPLBLD CKD-EPI 2021: 126.9 ML/MIN/1.73
FERRITIN SERPL-MCNC: 27.5 NG/ML (ref 13–150)
GLUCOSE SERPL-MCNC: 85 MG/DL (ref 65–99)
LABORATORY COMMENT REPORT: NORMAL
POTASSIUM SERPL-SCNC: 4.1 MMOL/L (ref 3.5–5.2)
SODIUM SERPL-SCNC: 138 MMOL/L (ref 136–145)
VIT B12 SERPL-MCNC: 448 PG/ML (ref 211–946)

## 2025-07-02 ENCOUNTER — OFFICE VISIT (OUTPATIENT)
Dept: FAMILY MEDICINE CLINIC | Facility: CLINIC | Age: 35
End: 2025-07-02
Payer: COMMERCIAL

## 2025-07-02 VITALS
HEART RATE: 88 BPM | DIASTOLIC BLOOD PRESSURE: 64 MMHG | TEMPERATURE: 97.8 F | WEIGHT: 143 LBS | RESPIRATION RATE: 16 BRPM | SYSTOLIC BLOOD PRESSURE: 86 MMHG | OXYGEN SATURATION: 99 % | BODY MASS INDEX: 28.07 KG/M2 | HEIGHT: 60 IN

## 2025-07-02 DIAGNOSIS — G89.29 CHRONIC NECK PAIN: ICD-10-CM

## 2025-07-02 DIAGNOSIS — M54.2 CHRONIC NECK PAIN: ICD-10-CM

## 2025-07-02 DIAGNOSIS — E55.9 VITAMIN D DEFICIENCY: ICD-10-CM

## 2025-07-02 DIAGNOSIS — R53.82 CHRONIC FATIGUE: Primary | ICD-10-CM

## 2025-07-02 PROCEDURE — 99214 OFFICE O/P EST MOD 30 MIN: CPT | Performed by: FAMILY MEDICINE

## 2025-07-02 PROCEDURE — 1125F AMNT PAIN NOTED PAIN PRSNT: CPT | Performed by: FAMILY MEDICINE

## 2025-07-02 PROCEDURE — 1160F RVW MEDS BY RX/DR IN RCRD: CPT | Performed by: FAMILY MEDICINE

## 2025-07-02 PROCEDURE — 1159F MED LIST DOCD IN RCRD: CPT | Performed by: FAMILY MEDICINE

## 2025-07-02 RX ORDER — IBUPROFEN 600 MG/1
600 TABLET, FILM COATED ORAL
COMMUNITY
Start: 2025-04-26 | End: 2025-07-02

## 2025-07-02 RX ORDER — PREDNISONE 20 MG/1
40 TABLET ORAL DAILY
Qty: 10 TABLET | Refills: 0 | Status: SHIPPED | OUTPATIENT
Start: 2025-07-02 | End: 2025-07-07

## 2025-07-02 RX ORDER — FLUTICASONE PROPIONATE 50 MCG
2 SPRAY, SUSPENSION (ML) NASAL DAILY
COMMUNITY

## 2025-07-02 NOTE — PROGRESS NOTES
Subjective     Alberto Vanegas is a 35 y.o. female.     Chief Complaint   Patient presents with    Chronic fatigue     After eating small meals.     Pain     Neck, all over body.     Dizziness       History of Present Illness     Feeling fatigue and tired for 3-4 months, has on/off palpitation, has poor appetite , poor energy.Not depressed.     Chronic neck pain for 2 weeks,4-6/10, worse with movement,  sx worse lately , Her Rt arm feels weak and numb.     Vit D defi; not on suppl    Anemia ; wa son iron pills   She is using Nexplanon       The following portions of the patient's history were reviewed and updated as appropriate: allergies, current medications, past family history, past medical history, past social history, past surgical history, and problem list.        Review of Systems   Constitutional:  Positive for fatigue.       Vitals:    07/02/25 1148   BP: (!) 86/64   Pulse: 88   Resp: 16   Temp: 97.8 °F (36.6 °C)   SpO2: 99%           07/02/25  1148   Weight: 64.9 kg (143 lb)         Body mass index is 27.93 kg/m².      Current Outpatient Medications   Medication Sig Dispense Refill    fluticasone (FLONASE) 50 MCG/ACT nasal spray Administer 2 sprays into the nostril(s) as directed by provider Daily.      diclofenac (VOLTAREN) 50 MG EC tablet Take 1 tablet by mouth 2 (Two) Times a Day. 30 tablet 0    predniSONE (DELTASONE) 20 MG tablet Take 2 tablets by mouth Daily for 5 days. 10 tablet 0    Prenatal 28-0.8 MG tablet Take 1 tablet by mouth Daily. (Patient not taking: Reported on 7/2/2025) 90 each 2     No current facility-administered medications for this visit.                Objective   Physical Exam  Vitals and nursing note reviewed.   Constitutional:       General: She is not in acute distress.     Appearance: She is not toxic-appearing.   Cardiovascular:      Rate and Rhythm: Normal rate and regular rhythm.      Heart sounds: Normal heart sounds. No murmur heard.  Pulmonary:      Effort: Pulmonary  effort is normal. No respiratory distress.      Breath sounds: Normal breath sounds. No stridor. No wheezing, rhonchi or rales.   Neurological:      Mental Status: She is alert and oriented to person, place, and time.   Psychiatric:         Mood and Affect: Mood normal.         Behavior: Behavior normal.         Thought Content: Thought content normal.           Assessment & Plan   Diagnoses and all orders for this visit:    1. Chronic fatigue (Primary)  Comments:  needs w/u  Orders:  -     Vitamin B12  -     CBC (No Diff)  -     Hemoglobin A1c  -     TSH Rfx On Abnormal To Free T4  -     Comprehensive metabolic panel  -     Iron Profile w/o Ferritin    2. Chronic neck pain  -     Ambulatory Referral to Physical Therapy for Evaluation & Treatment  -     predniSONE (DELTASONE) 20 MG tablet; Take 2 tablets by mouth Daily for 5 days.  Dispense: 10 tablet; Refill: 0  -     diclofenac (VOLTAREN) 50 MG EC tablet; Take 1 tablet by mouth 2 (Two) Times a Day.  Dispense: 30 tablet; Refill: 0    3. Vitamin D deficiency  -     Vitamin D,25-Hydroxy             Patient was given instructions and counseling regarding her condition or for health maintenance advice. Please see specific information pulled into the AVS if appropriate.       I have fully discussed the nature of the medical condition(s) risks, complications, management, safe and proper use of medications.   Pt stated no allergy to the above prescribed medication.  I have discussed the SIDE EFFECT OF MEDICATION and importance TO report any side effect , the patient expressed good understanding.  Encouraged medication compliance and the importance of keeping scheduled follow up appointments with me and any other providers.    Patient instructed to follow up with our office for results on any labs/imaging ordered during this visit.    Home care discussed  All questions answered  Patient verbalizes understanding and agrees to treatment plan.     Follow up: Return in about 2  weeks (around 7/16/2025) for follow up on current illness.

## 2025-07-03 LAB
25(OH)D3+25(OH)D2 SERPL-MCNC: 66.6 NG/ML (ref 30–100)
ALBUMIN SERPL-MCNC: 4.1 G/DL (ref 3.9–4.9)
ALP SERPL-CCNC: 51 IU/L (ref 44–121)
ALT SERPL-CCNC: 12 IU/L (ref 0–32)
AST SERPL-CCNC: 16 IU/L (ref 0–40)
BILIRUB SERPL-MCNC: <0.2 MG/DL (ref 0–1.2)
BUN SERPL-MCNC: 11 MG/DL (ref 6–20)
BUN/CREAT SERPL: 20 (ref 9–23)
CALCIUM SERPL-MCNC: 8.8 MG/DL (ref 8.7–10.2)
CHLORIDE SERPL-SCNC: 103 MMOL/L (ref 96–106)
CO2 SERPL-SCNC: 21 MMOL/L (ref 20–29)
CREAT SERPL-MCNC: 0.55 MG/DL (ref 0.57–1)
EGFRCR SERPLBLD CKD-EPI 2021: 122 ML/MIN/1.73
ERYTHROCYTE [DISTWIDTH] IN BLOOD BY AUTOMATED COUNT: 13.6 % (ref 11.7–15.4)
GLOBULIN SER CALC-MCNC: 2.4 G/DL (ref 1.5–4.5)
GLUCOSE SERPL-MCNC: 72 MG/DL (ref 70–99)
HBA1C MFR BLD: 5.5 % (ref 4.8–5.6)
HCT VFR BLD AUTO: 42.3 % (ref 34–46.6)
HGB BLD-MCNC: 13.3 G/DL (ref 11.1–15.9)
IRON SATN MFR SERPL: 32 % (ref 15–55)
IRON SERPL-MCNC: 109 UG/DL (ref 27–159)
MCH RBC QN AUTO: 29.2 PG (ref 26.6–33)
MCHC RBC AUTO-ENTMCNC: 31.4 G/DL (ref 31.5–35.7)
MCV RBC AUTO: 93 FL (ref 79–97)
PLATELET # BLD AUTO: 232 X10E3/UL (ref 150–450)
POTASSIUM SERPL-SCNC: 4.4 MMOL/L (ref 3.5–5.2)
PROT SERPL-MCNC: 6.5 G/DL (ref 6–8.5)
RBC # BLD AUTO: 4.55 X10E6/UL (ref 3.77–5.28)
SODIUM SERPL-SCNC: 138 MMOL/L (ref 134–144)
TIBC SERPL-MCNC: 345 UG/DL (ref 250–450)
TSH SERPL DL<=0.005 MIU/L-ACNC: 2.11 UIU/ML (ref 0.45–4.5)
UIBC SERPL-MCNC: 236 UG/DL (ref 131–425)
VIT B12 SERPL-MCNC: 392 PG/ML (ref 232–1245)
WBC # BLD AUTO: 7.3 X10E3/UL (ref 3.4–10.8)

## 2025-07-17 ENCOUNTER — OFFICE VISIT (OUTPATIENT)
Dept: FAMILY MEDICINE CLINIC | Facility: CLINIC | Age: 35
End: 2025-07-17
Payer: COMMERCIAL

## 2025-07-17 VITALS
WEIGHT: 146.2 LBS | DIASTOLIC BLOOD PRESSURE: 50 MMHG | OXYGEN SATURATION: 98 % | SYSTOLIC BLOOD PRESSURE: 90 MMHG | HEART RATE: 95 BPM | TEMPERATURE: 99.1 F | BODY MASS INDEX: 28.7 KG/M2 | HEIGHT: 60 IN

## 2025-07-17 DIAGNOSIS — M54.50 CHRONIC MIDLINE LOW BACK PAIN WITHOUT SCIATICA: ICD-10-CM

## 2025-07-17 DIAGNOSIS — G89.29 CHRONIC MIDLINE LOW BACK PAIN WITHOUT SCIATICA: ICD-10-CM

## 2025-07-17 DIAGNOSIS — R53.82 CHRONIC FATIGUE: Primary | ICD-10-CM

## 2025-07-17 DIAGNOSIS — G89.29 CHRONIC NECK PAIN: ICD-10-CM

## 2025-07-17 DIAGNOSIS — M54.2 CHRONIC NECK PAIN: ICD-10-CM

## 2025-07-17 PROCEDURE — 1160F RVW MEDS BY RX/DR IN RCRD: CPT | Performed by: FAMILY MEDICINE

## 2025-07-17 PROCEDURE — 1159F MED LIST DOCD IN RCRD: CPT | Performed by: FAMILY MEDICINE

## 2025-07-17 PROCEDURE — 1125F AMNT PAIN NOTED PAIN PRSNT: CPT | Performed by: FAMILY MEDICINE

## 2025-07-17 PROCEDURE — 99214 OFFICE O/P EST MOD 30 MIN: CPT | Performed by: FAMILY MEDICINE

## 2025-07-17 RX ORDER — SWAB
1 SWAB, NON-MEDICATED MISCELLANEOUS DAILY
Qty: 90 EACH | Refills: 2 | Status: SHIPPED | OUTPATIENT
Start: 2025-07-17

## 2025-07-17 RX ORDER — LEVOCETIRIZINE DIHYDROCHLORIDE 5 MG/1
5 TABLET, FILM COATED ORAL
COMMUNITY
Start: 2025-06-24 | End: 2026-06-24

## 2025-07-17 NOTE — PROGRESS NOTES
Subjective     Alberto Vanegas is a 35 y.o. female.     Chief Complaint   Patient presents with    Follow-up     Pt is present to f/u on recent dx of fatigue and neck pain. Pt states both issues have gotten worse since previous visit. New onset of foot pain.     Fatigue    Neck Pain       History of Present Illness     Presents for FU On the followings;     Pt with chronic fatigue and feeling tired with poor energy.  Labs done few weeks ago, showed; mild elevated LDL , b12 on lower end of normal       Chronic neck pain and lower back pain for > 2 months , 4-8/10, worse with movement, feels her M. Stiff.   Sx worse lately , her Rt arm feels weak and numb.          Labs has been ordered and personally/independently interpreted , discussed with pt     Lab Results   Component Value Date    CHLPL 178 03/05/2018    TRIG 66 03/05/2018    HDL 63 03/05/2018     (H) 03/05/2018     Lab Results   Component Value Date    GLUCOSE 72 07/02/2025    BUN 11 07/02/2025    CREATININE 0.55 (L) 07/02/2025     07/02/2025    K 4.4 07/02/2025     07/02/2025    CALCIUM 8.8 07/02/2025    PROTEINTOT 6.5 07/02/2025    ALBUMIN 4.1 07/02/2025    ALT 12 07/02/2025    AST 16 07/02/2025    ALKPHOS 51 07/02/2025    BILITOT <0.2 07/02/2025    GLOB 2.4 07/02/2025    AGRATIO 2.0 12/01/2022    BCR 20 07/02/2025    EGFR 122 07/02/2025     Lab Results   Component Value Date    AJQLFYWM91 392 07/02/2025     Lab Results   Component Value Date    WBC 7.3 07/02/2025    HGB 13.3 07/02/2025    HCT 42.3 07/02/2025    MCV 93 07/02/2025     07/02/2025            The following portions of the patient's history were reviewed and updated as appropriate: allergies, current medications, past family history, past medical history, past social history, past surgical history, and problem list.        Review of Systems   Musculoskeletal:  Positive for back pain, myalgias and neck pain.       Vitals:    07/17/25 1430   BP: 90/50   Pulse: 95    Temp: 99.1 °F (37.3 °C)   SpO2: 98%           07/17/25  1430   Weight: 66.3 kg (146 lb 3.2 oz)         Body mass index is 28.55 kg/m².      Current Outpatient Medications   Medication Sig Dispense Refill    fluticasone (FLONASE) 50 MCG/ACT nasal spray Administer 2 sprays into the nostril(s) as directed by provider Daily.      levocetirizine (XYZAL) 5 MG tablet Take 1 tablet by mouth.      Prenatal 28-0.8 MG tablet Take 1 tablet by mouth Daily. 90 each 2    diclofenac (VOLTAREN) 50 MG EC tablet Take 1 tablet by mouth 2 (Two) Times a Day. 30 tablet 0     No current facility-administered medications for this visit.                Objective   Physical Exam  Vitals and nursing note reviewed.   Constitutional:       General: She is not in acute distress.     Appearance: She is not toxic-appearing.   Cardiovascular:      Rate and Rhythm: Normal rate and regular rhythm.      Heart sounds: Normal heart sounds. No murmur heard.  Pulmonary:      Effort: Pulmonary effort is normal. No respiratory distress.      Breath sounds: Normal breath sounds. No stridor. No wheezing or rhonchi.   Musculoskeletal:         General: Tenderness present. No swelling or deformity.      Cervical back: Neck supple. Tenderness present.   Lymphadenopathy:      Cervical: No cervical adenopathy.   Skin:     Findings: No bruising.   Neurological:      Mental Status: She is alert and oriented to person, place, and time.   Psychiatric:         Mood and Affect: Mood normal.         Behavior: Behavior normal.         Thought Content: Thought content normal.           Assessment & Plan   Diagnoses and all orders for this visit:    1. Chronic fatigue (Primary)  Comments:  Labs has been ordered and personally/independently interpreted , discussed with pt  Orders:  -     Prenatal 28-0.8 MG tablet; Take 1 tablet by mouth Daily.  Dispense: 90 each; Refill: 2    2. Chronic neck pain  -     Ambulatory Referral to Physical Therapy for Evaluation & Treatment    3.  Chronic midline low back pain without sciatica  -     Ambulatory Referral to Physical Therapy for Evaluation & Treatment             Patient was given instructions and counseling regarding her condition or for health maintenance advice. Please see specific information pulled into the AVS if appropriate.       I have fully discussed the nature of the medical condition(s) risks, complications, management, safe and proper use of medications.   Pt stated no allergy to the above prescribed medication.  I have discussed the SIDE EFFECT OF MEDICATION and importance TO report any side effect , the patient expressed good understanding.  Encouraged medication compliance and the importance of keeping scheduled follow up appointments with me and any other providers.    Patient instructed to follow up with our office for results on any labs/imaging ordered during this visit.    Home care discussed  All questions answered  Patient verbalizes understanding and agrees to treatment plan.     Follow up: Return in about 4 months (around 11/17/2025) for physical.

## 2025-07-28 ENCOUNTER — OFFICE VISIT (OUTPATIENT)
Dept: FAMILY MEDICINE CLINIC | Facility: CLINIC | Age: 35
End: 2025-07-28
Payer: COMMERCIAL

## 2025-07-28 VITALS
TEMPERATURE: 98.4 F | OXYGEN SATURATION: 98 % | BODY MASS INDEX: 29.13 KG/M2 | DIASTOLIC BLOOD PRESSURE: 60 MMHG | SYSTOLIC BLOOD PRESSURE: 96 MMHG | WEIGHT: 148.4 LBS | HEIGHT: 60 IN | RESPIRATION RATE: 16 BRPM | HEART RATE: 85 BPM

## 2025-07-28 DIAGNOSIS — M54.2 CHRONIC NECK PAIN: ICD-10-CM

## 2025-07-28 DIAGNOSIS — I95.89 OTHER SPECIFIED HYPOTENSION: ICD-10-CM

## 2025-07-28 DIAGNOSIS — N92.1 MENORRHAGIA WITH IRREGULAR CYCLE: Primary | ICD-10-CM

## 2025-07-28 DIAGNOSIS — G89.29 CHRONIC NECK PAIN: ICD-10-CM

## 2025-07-28 DIAGNOSIS — D50.0 IRON DEFICIENCY ANEMIA DUE TO CHRONIC BLOOD LOSS: ICD-10-CM

## 2025-07-28 DIAGNOSIS — J30.2 SEASONAL ALLERGIES: ICD-10-CM

## 2025-07-28 PROCEDURE — 1160F RVW MEDS BY RX/DR IN RCRD: CPT | Performed by: FAMILY MEDICINE

## 2025-07-28 PROCEDURE — 1159F MED LIST DOCD IN RCRD: CPT | Performed by: FAMILY MEDICINE

## 2025-07-28 PROCEDURE — 1125F AMNT PAIN NOTED PAIN PRSNT: CPT | Performed by: FAMILY MEDICINE

## 2025-07-28 PROCEDURE — 99214 OFFICE O/P EST MOD 30 MIN: CPT | Performed by: FAMILY MEDICINE

## 2025-07-28 NOTE — PROGRESS NOTES
Subjective     Alberto Vanegas is a 35 y.o. female.     Chief Complaint   Patient presents with    Menstrual Problem     13 days on period.     Headache    Dizziness       History of Present Illness     Presents to discuss the followings;     Has irregular cycle   LMP 2 weeks ago, came heavy , last for 2 weeks   She was on Perimount N nad maribell for 7 months   She is on Nexplanon for 1 year   Her cycle more heavy since insertion   Feels fatigue. Has on/off HA, feels dizzy on/off   Has Anemia , not taking MVT  Her BP low   Neck pain ; pain on/off, better with Voltaren     SA; sx worse with weather changes , doing well on current Rx.    Lab Results   Component Value Date    LABIRON 32 07/02/2025    TIBC 345 07/02/2025    FERRITIN 27.50 05/29/2024     Lab Results   Component Value Date    WBC 7.3 07/02/2025    HGB 13.3 07/02/2025    HCT 42.3 07/02/2025    MCV 93 07/02/2025     07/02/2025            The following portions of the patient's history were reviewed and updated as appropriate: allergies, current medications, past family history, past medical history, past social history, past surgical history, and problem list.      Vitals:    07/28/25 1447   BP: 96/60   Pulse: 85   Resp: 16   Temp: 98.4 °F (36.9 °C)   SpO2: 98%           07/28/25  1447   Weight: 67.3 kg (148 lb 6.4 oz)         Body mass index is 28.98 kg/m².      Current Outpatient Medications   Medication Sig Dispense Refill    diclofenac (VOLTAREN) 50 MG EC tablet Take 1 tablet by mouth 2 (Two) Times a Day. 30 tablet 0    fluticasone (FLONASE) 50 MCG/ACT nasal spray Administer 2 sprays into the nostril(s) as directed by provider Daily.      levocetirizine (XYZAL) 5 MG tablet Take 1 tablet by mouth.      Prenatal 28-0.8 MG tablet Take 1 tablet by mouth Daily. 90 each 2     No current facility-administered medications for this visit.                Objective   Physical Exam  Vitals and nursing note reviewed.   Constitutional:       General: She is  not in acute distress.     Appearance: She is not toxic-appearing.   Cardiovascular:      Rate and Rhythm: Normal rate and regular rhythm.      Heart sounds: Normal heart sounds. No murmur heard.  Pulmonary:      Effort: Pulmonary effort is normal. No respiratory distress.      Breath sounds: Normal breath sounds. No wheezing, rhonchi or rales.   Skin:     Coloration: Skin is pale.   Neurological:      Mental Status: She is alert and oriented to person, place, and time.   Psychiatric:         Mood and Affect: Mood normal.         Behavior: Behavior normal.           Assessment & Plan   Diagnoses and all orders for this visit:    1. Menorrhagia with irregular cycle (Primary)  Comments:  hoda from Bitcaston  advised to take it off and be on OCP/Ring/OR PATCHES    2. Iron deficiency anemia due to chronic blood loss  Comments:  advised to take daily prenatal    3. Other specified hypotension  Comments:  discussed hydration , close bp monitoring    4. Chronic neck pain  Comments:  better with volta , contineu PRN    5. Seasonal allergies  Comments:  stable, continue      Patient was given instructions and counseling regarding her condition or for health maintenance advice. Please see specific information pulled into the AVS if appropriate.         I have fully discussed the nature of the medical condition(s) risks, complications, management, safe and proper use of medications.   Pt stated no allergy to the above prescribed medication.  I have discussed the SIDE EFFECT OF MEDICATION and importance TO report any side effect , the patient expressed good understanding.  Encouraged medication compliance and the importance of keeping scheduled follow up appointments with me and any other providers.    Patient instructed to follow up with our office for results on any labs/imaging ordered during this visit.    Home care discussed  All questions answered  Patient verbalizes understanding and agrees to treatment plan.      Follow up: Return in about 2 months (around 9/28/2025) for follow up on current illness.